# Patient Record
Sex: MALE | Race: OTHER
[De-identification: names, ages, dates, MRNs, and addresses within clinical notes are randomized per-mention and may not be internally consistent; named-entity substitution may affect disease eponyms.]

---

## 2018-02-15 ENCOUNTER — HOSPITAL ENCOUNTER (EMERGENCY)
Dept: HOSPITAL 80 - FED | Age: 28
Discharge: HOME | End: 2018-02-15
Payer: COMMERCIAL

## 2018-02-15 VITALS
SYSTOLIC BLOOD PRESSURE: 138 MMHG | OXYGEN SATURATION: 95 % | TEMPERATURE: 97.7 F | RESPIRATION RATE: 16 BRPM | HEART RATE: 75 BPM | DIASTOLIC BLOOD PRESSURE: 69 MMHG

## 2018-02-15 DIAGNOSIS — T65.6X1A: Primary | ICD-10-CM
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
## 2018-02-15 DIAGNOSIS — T22.511A: ICD-10-CM

## 2018-02-15 NOTE — EDPHY
H & P


Time Seen by Provider: 02/15/18 18:23


HPI/ROS: 





CHIEF COMPLAINT: 


Chemical burn





HISTORY OF PRESENT ILLNESS: 


Patient complains of a chemical skin burn to the right forearm.  This happened 

this evening.  He was working with phalloidin stain in the microbiology lab at 

[facility].  He works as a laboratory technician.  This chemical 

accidentally splashed onto the bare scan of the right posterior forearm.  This 

is a very small area.  Non circumferential.  There is pain at the site of 

contact.  No pain distally.  No difficulty moving the extremity.  There is no 

contact with the head, face, eyes or oral mucosa.  No contact anywhere else on 

his person.  He irrigated the area with water copiously.  He did not apply any 

topical medications.  No other associated complaints or modifying factors








TETANUS STATUS:


Up-to-date





MEDICAL/SURGICAL/SOCIAL HISTORY:


Denies any medical diagnoses.  [employer] [facility] [employer] student.  Originally from 

[place]





REVIEW OF SYSTEMS:


Ten systems reviewed and are negative unless otherwise noted in the HPI





EXAMINATION


General Appearance:  Alert, no distress


Head: normocephalic, atraumatic


ENT:  Pupils equal round reactive.  No icterus or injection.  No periorbital 

erythema.


Cardiovascular:  Pulses normal throughout. Brisk cap refill


Neurological:  A&O, sensory symmetric, strength symmetric


Skin:  Grossly intact for warm and dry.  There is a 1.5 cm, linear area of 

erythema to the right posterior lateral forearm.  Non circumferential.  No 

bleeding.  Local irritation noted.  No blisters


Extremities:  Nontender, no pedal edema





DIFFERENTIAL DIAGNOSES:


Including but not limited to chemical burn, chemical exposure








MDM:


6:23 p.m.


Superficial chemical burn to the posterior-lateral aspect of the right wrist.  

This is 1.5 cm in length and thus non circumferential.  Poison Control has been 

contacted by the RN (Ch[place]) and a case number is documented.  He is in no acute 

distress with normal vital signs.  He is neuro intact distally.  Supportive 

care is the only recommendation from poison Control.  He will be provided a 

nonocclusive dressing.  We discussed the importance of protective equipment in 

the lab.  We discussed worker's compensation follow-up in ED precautions.  

Discharged home stable condition.








SUPERVISION:


This patient was independently evaluated without direct involvement of or 

examination by the attending physician. 





ED Precautions: 


Worsening pain. Erythema, edema, cyanosis, pallor, paresthesia or anesthesia.








Smoking Status: Never smoked


Constitutional: 


 Initial Vital Signs











Temperature (C)  97.7 F   02/15/18 17:49


 


Heart Rate  75   02/15/18 17:49


 


Respiratory Rate  16   02/15/18 17:49


 


Blood Pressure  138/69 H  02/15/18 17:49


 


O2 Sat (%)  95   02/15/18 17:49








 











O2 Delivery Mode               Room Air














Allergies/Adverse Reactions: 


 





No Known Allergies Allergy (Unverified 04/05/15 13:51)


 








Home Medications: 














 Medication  Instructions  Recorded


 


Bactrim DS  04/05/15














MDM/Departure





- Depart


Disposition: Home, Routine, Self-Care


Clinical Impression: 


 Chemical burn





Condition: Good


Instructions:  Chemical Skin Burn (ED)


Additional Instructions: 


1. Nonocclusive dressing as needed as discussed


2. Follow up with worker's compensation Clinic


3. ED precautions as discussed


Stand Alone Forms:  Work Comp Follow Up


Referrals: 


Charbel MARLOW [Clinic] - As per Instructions

## 2018-03-16 ENCOUNTER — HOSPITAL ENCOUNTER (EMERGENCY)
Dept: HOSPITAL 80 - FED | Age: 28
Discharge: HOME | End: 2018-03-16
Payer: COMMERCIAL

## 2018-03-16 VITALS
SYSTOLIC BLOOD PRESSURE: 133 MMHG | TEMPERATURE: 98.1 F | HEART RATE: 73 BPM | DIASTOLIC BLOOD PRESSURE: 64 MMHG | RESPIRATION RATE: 18 BRPM

## 2018-03-16 VITALS — OXYGEN SATURATION: 96 %

## 2018-03-16 DIAGNOSIS — K64.5: Primary | ICD-10-CM

## 2018-03-16 PROCEDURE — 06BY0ZC EXCISION OF HEMORRHOIDAL PLEXUS, OPEN APPROACH: ICD-10-PCS | Performed by: EMERGENCY MEDICINE

## 2018-03-16 NOTE — EDPHY
H & P


Stated Complaint: c/o anal discomfort, recent constip causing pain, improved, 

this am worse


Time Seen by Provider: 03/16/18 01:18


HPI/ROS: 





Chief Complaint:  Rectal pain





HPI:  A 27-year-old male presenting with 2 days of worsening rectal pain.  

Patient states he has noted swelling in that area is concerned he might have 

some hemorrhoids.  Patient states that he frequently strange when he has a 

bowel movement.  No nausea or vomiting.  He also has been taking some Imodium 

to decrease his stools at the recommendation of his girlfriend.  No nausea or 

vomiting.  He has not been taking any medication for the pain.





ROS:  10 point Review of Systems is negative except as noted in the HPI.





PMH:  Denies





Social History: No smoking, no alcohol,  no recreational drug use





Family History: non-contributory





Physical Exam:


Gen: Awake, Alert, No Distress


HEENT:  


     Nose: no rhinorrhea


     Eyes: PERRLA, EOMI


     Mouth: Moist mucosa 


Neck: Supple, no JVD


Chest: nontender, lungs clear to auscultation


Heart: S1, S2 normal, no murmur


Abd: Soft, non-tender, no guarding


Back: no CVA tenderness, no midline tenderness 


Rectal:  Patient has a large 1 cm x 2 cm thrombosed hemorrhoid on the left.


Genital:  Normal uncircumcised penis, no scrotal tenderness swelling or 

erythema.


Ext: no edema, non-tender


Skin: no rash


Neuro: CN II-XII intact, Sensation grossly intact, Strength 5/5 in bilateral 

upper and lower extremities








- Medical/Surgical History


Hx Asthma: No


Hx Chronic Respiratory Disease: No


Hx Diabetes: No


Hx Cardiac Disease: No


Hx Renal Disease: No


Hx Cirrhosis: No


Hx Alcoholism: No


Hx HIV/AIDS: No


Hx Splenectomy or Spleen Trauma: No


Other PMH: denies





- Social History


Smoking Status: Never smoked


Constitutional: 


 Initial Vital Signs











Temperature (C)  36.8 C   03/16/18 00:42


 


Heart Rate  82   03/16/18 00:42


 


Respiratory Rate  16   03/16/18 00:42


 


Blood Pressure  114/75   03/16/18 00:42


 


O2 Sat (%)  96   03/16/18 00:42








 











O2 Delivery Mode               Room Air














Allergies/Adverse Reactions: 


 





No Known Allergies Allergy (Verified 03/16/18 00:48)


 








Home Medications: 














 Medication  Instructions  Recorded


 


NK [No Known Home Meds]  03/16/18














Medical Decision Making


Procedures: 





Procedure note:  Excision of thrombosed external hemorrhoid.


Indication:  Thrombosed external hemorrhoid.  Patient was placed in a prone 

position.  Tape was used to retract the buttocks.  Patient was prepped with 

Hibiclens.  The area was anesthetized with 1% lidocaine with epinephrine 

infiltration.  The thrombosed hemorrhoid was excised in a radial direction with 

an 11 blade.  An elliptical excision was performed.  A moderate amount of blood 

clot was removed.  Area was cleaned thoroughly.  Patient was given a gauze pad.

  He tolerated the procedure without any difficulty.  There were no 

complications.  Performed by myself.





Departure





- Departure


Disposition: Home, Routine, Self-Care


Clinical Impression: 


 Hemorrhoid





Condition: Good


Instructions:  Hemorrhoids (ED), High Fiber Diet (ED), Thrombosed Hemorrhoid (ED

)


Additional Instructions: 


Make sure to increase the fiber in your diet.


You may start taking MiraLax every day according to package instructions.


May take ibuprofen alternating with acetaminophen as needed for pain.


Soak in a warm tub 2 to 3 times a day.


Drink at least 8 glasses of water a day.


It is important that you not strain when you have a bowel movement.


Follow up with General surgery in about a week for further evaluation.


Referrals: 


Jase Ferguson MD [Medical Doctor] - As per Instructions

## 2019-01-07 ENCOUNTER — HOSPITAL ENCOUNTER (EMERGENCY)
Dept: HOSPITAL 80 - FED | Age: 29
Discharge: HOME | End: 2019-01-07
Payer: MEDICAID

## 2019-01-07 VITALS — DIASTOLIC BLOOD PRESSURE: 61 MMHG | SYSTOLIC BLOOD PRESSURE: 122 MMHG

## 2019-01-07 DIAGNOSIS — R04.0: Primary | ICD-10-CM

## 2019-01-07 NOTE — EDPHY
H & P


Stated Complaint: Bloody nose x2 hours, wont stop


Time Seen by Provider: 01/07/19 01:17


HPI/ROS: 





Chief Complaint:  Nosebleed





HPI:  28-year-old male's been having a nosebleed for the last 2 hr.  Patient 

states that is mixed with blood and mucus.  Has been a slow ooze.  Has been 

using some nasal spray recently.  Does have a history of some bloody nose in 

the past.  Denies any other foreign bodies in his nose.  No other drug use.  No 

lightheadedness or fainting.  No history of blood clotting disorders in the 

family.  No recent trauma.





ROS:  10 systems were reviewed and were negative except those elements noted in 

the HPI.





PMH:  Denies





Social History: No smoking, no alcohol,  no recreational drug use





Family History: non-contributory





Physical Exam:


Gen: Awake, Alert, No Distress


HEENT:  


     Nose: no rhinorrhea, no blood.  No dried blood.  No clot, No active 

bleeding.


     Eyes: PERRLA, EOMI


     Mouth: Moist mucosa 


Neck: Supple, no JVD


Chest: nontender, lungs clear to auscultation


Heart: S1, S2 normal, no murmur


Abd: Soft, non-tender, no guarding


Back: no CVA tenderness, no midline tenderness 


Ext: no edema, non-tender


Skin: no rash


Neuro: CN II-XII intact, Sensation grossly intact, Strength 5/5 in bilateral 

upper and lower extremities








- Personal History


Current Tetanus Diphtheria and Acellular Pertussis (TDAP): No





- Medical/Surgical History


Hx Asthma: No


Hx Chronic Respiratory Disease: No


Hx Diabetes: No


Hx Cardiac Disease: No


Hx Renal Disease: No


Hx Cirrhosis: No


Hx Alcoholism: No


Hx HIV/AIDS: No


Hx Splenectomy or Spleen Trauma: No


Other PMH: denies





- Social History


Smoking Status: Never smoked


Constitutional: 


 Initial Vital Signs











Temperature (C)  36.8 C   01/07/19 01:13


 


Heart Rate  80   01/07/19 01:13


 


Respiratory Rate  18   01/07/19 01:13


 


Blood Pressure  122/61 H  01/07/19 01:13


 


O2 Sat (%)  95   01/07/19 01:13








 











O2 Delivery Mode               Room Air














Allergies/Adverse Reactions: 


 





No Known Allergies Allergy (Verified 01/07/19 01:13)


 








Home Medications: 














 Medication  Instructions  Recorded


 


NK [No Known Home Meds]  03/16/18














Medical Decision Making


ED Course/Re-evaluation: 





28-year-old male presenting with epistaxis.  No active bleeding here.  I have 

instilled Francisco J-Synephrine and applied a client.  He has had no further bleeding.

  Will discharge with follow-up as an outpatient.





- Data Points


Medications Given: 


 








Discontinued Medications





Lidocaine HCl (Lidocaine Hcl 4% Topical Solution)  0 ml TP EDNOW ONE


   Stop: 01/07/19 01:31


   Last Admin: 01/07/19 01:32 Dose:  1 btl


Oxymetazoline HCl (Afrin Nasal Spray)  2 sprays EACHNARE EDNOW ONE


   Stop: 01/07/19 01:30


   Last Admin: 01/07/19 01:32 Dose:  2 sprays


Silver Nitrate/Potassium Nitrate (Silver Nitrate Applicator)  1 each TP EDNOW 

ONE


   Stop: 01/07/19 01:30


   Last Admin: 01/07/19 01:33 Dose:  1 each








Departure





- Departure


Disposition: Home, Routine, Self-Care


Clinical Impression: 


 Acute anterior epistaxis





Condition: Good


Instructions:  Nosebleed (ED)


Additional Instructions: 


If the bleeding returns apply the nasal clamp ear nose Aleve in place for 20-30 

minutes.  If this does not control the bleeding return to the emergency 

department.


Follow up with primary care physician in 3-4 days for further evaluation.


Referrals: 


Diamond Azevedo DO [Doctor of Osteopathy] - As per Instructions

## 2019-01-11 ENCOUNTER — HOSPITAL ENCOUNTER (EMERGENCY)
Dept: HOSPITAL 80 - FED | Age: 29
Discharge: HOME | End: 2019-01-11
Payer: MEDICAID

## 2019-01-11 VITALS — SYSTOLIC BLOOD PRESSURE: 116 MMHG | DIASTOLIC BLOOD PRESSURE: 66 MMHG

## 2019-01-11 DIAGNOSIS — R04.0: Primary | ICD-10-CM

## 2019-01-11 PROCEDURE — 3E09XGC INTRODUCTION OF OTHER THERAPEUTIC SUBSTANCE INTO NOSE, EXTERNAL APPROACH: ICD-10-PCS

## 2019-01-11 NOTE — EDPHY
HPI/HX/ROS/PE/MDM


Narrative: 


CLINICAL IMPRESSION:  Bilateral anterior epistaxis 





ASSESSMENT/PLAN: 





Patient is a 28-year-old male with no significant medical history who presents 

to the emergency department complaining of bilateral epistaxis, right greater 

than left.  Patient is afebrile and nontoxic-appearing, he is in no acute 

distress on arrival. physical examination revealed very small area of 

friability on the bilateral anterior septum. There was no obvious hematoma, 

perforation, foreign body, mass or evidence of a posterior epistaxis.  Afrin 

was administered bilaterally and the small areas were cauterized with silver 

nitrate as discussed in the procedure note. The patient was observed for a 

period of time with no recurrent bleeding or evidence of posterior epistaxis. 





History and physical examination is consistent with bilateral anterior 

epistaxis. On repeat examination the patient is well appearing, there is no 

active bleeding and no blood noted in the posterior pharynx.  Patient does not 

have a primary care provider, I provided a list of referrals for him to 

establish care with.  Strict return precautions were discussed- patient will 

return for recurrent bleeding, dizziness, headache, chest pain, shortness of 

breath or for any other concerning symptom. The patient and family members all 

verbalized understanding and they were in agreement with this plan.





Case and plan of care discussed with Dr. Ladd.





DIFFERENTIAL DX: 


Anterior epistaxis, posterior epistaxis, septal perforation, septal hematoma, 

acute blood loss anemia 





ED PROCEDURES: 





Procedure:  Epistaxis control.


After verbal consent was obtained, the patient was anesthetized with Afrin.  

The anterior epistaxis was identified, bilateral anterior septum.  The patient 

was treated with silver nitrate.  Following the procedure the patient was re-

examined and the bleeding was well controlled.  The patient tolerated the 

procedure well.  The procedure was performed by myself.





ED COURSE: 


CHIEF COMPLAINT:  Nose bleed 





HPI: 





Patient is a 28-year-old male who presents to the emergency department 

complaining of bilateral epistaxis.  Patient reports that he woke up this 

morning and noticed some blood on his pillow, realized that he was bleeding 

from his bilateral nares.  The left side stopped.  However the right-side 

continued to bleed.  Patient applied pressure and proceeded to the emergency 

department as he could not get it completely stopped.  Patient does report 

increased difficulty with nosebleeds over the last 2-3 weeks, he denies any 

trauma, fever, chills, headache, dizziness, chest pain or shortness of breath.  

He is not on aspirin or anticoagulation.


_________________ 


PAST MEDICAL HISTORY:  Denies 


Pertinent Past Surgical History:  Denies 


Family History:  Not contributory 


Social History:  Denies smoking, denies alcohol and denies illicit drug use. 


_________________ 


ROS: 


A full 10 point review of systems was negative except for those mentioned in 

HPI. 


_________________ 


PHYSICAL EXAM: 


General Appearance: Alert, oriented, appropriate, cooperative, NAD, well 

hydrated, non-toxic appearing, VSS, no hypoxia. 


HENT:


Normocephalic, atraumatic


TMs are clear bilaterally no perforation or FB, no injection, no evidence of 

serous or mucopurulent otitis. 


Left nares is clear, mucosa is pink.  There is a very small area of friability 

and mild ooze on the anterior septum.


Right nares with a very small friable area on the anterior septum, not actively 

bleeding.


There is no evidence of septal perforation or septal hematoma.


Oropharynx clear is no erythema or exudates, no tonsillar hypertrophy or 

asymmetry.  There is no blood in the posterior pharynx.  Dentition without 

abnormality.


Eyes: PERRLA, no acute vision change, nystagmus, swelling, discharge, pain or 

photosensitivity. Conjunctiva pink, no pallor or injection 


Neck: Supple, nontender, no lymphadenopathy, no midline pain, FROM, no 

meningismus. 


Respiratory: There are no retractions, lungs are clear to auscultation. 


Cardiac: Regular rate and rhythm, no murmurs or gallops. 


Gastrointestinal: Abdomen is soft, nontender, bowel sounds normal, no masses/

hernia, no rigidity, guarding or focal peritoneal findings. 


Skin: Warm, dry, no rashes, no nodules on palpation. 


_________________ 


MEDICAL DECISION MAKING: 


Patient was seen independently. Secondary supervising physician at time of 

evaluation was Dr. Ladd. 


Diagnosis:  Right anterior epistaxis. New, requires workup 


Summary: See Assessment and Plan for summary of ED visit  


Clinical lab tests:  Not applicable. 


Independent visualization of images, tracing, or specimens:  Not applicable. 


Decision to obtain medical records or history from someone other than the 

patient:  No 


Review / Summarize previous medical records:  Yes 


Discussed patient with another provider:  Yes, Dr. Ladd 


Patient Progress:  Stable, discharged home.  (Carmela Klein)


ED Course: 





The patient was evaluated and managed by the physician assistant.  I have 

reviewed this chart and I agree with the findings and plan of care as documented

, as indicated by my signature.  I am the secondary supervising physician. (

Magaly Ladd)





- Data Points


Medications Given: 





 








Discontinued Medications





Oxymetazoline HCl (Afrin Nasal Spray)  2 sprays EACHNARE EDNOW ONE


   Stop: 01/11/19 09:02


   Last Admin: 01/11/19 09:09 Dose:  2 sprays


Silver Nitrate/Potassium Nitrate (Silver Nitrate Applicator)  1 each TP EDNOW 

ONE


   Stop: 01/11/19 09:03


   Last Admin: 01/11/19 09:10 Dose:  1 each








General


Initial Vital Signs: 





 Initial Vital Signs











Temperature (C)  36.5 C   01/11/19 08:22


 


Heart Rate  54 L  01/11/19 08:22


 


Respiratory Rate  16   01/11/19 08:22


 


Blood Pressure  115/63   01/11/19 08:22


 


O2 Sat (%)  99   01/11/19 08:22








 











O2 Delivery Mode               Room Air














Allergies/Adverse Reactions: 


 





No Known Allergies Allergy (Verified 01/11/19 08:21)


 








Home Medications: 














 Medication  Instructions  Recorded


 


Accucaine Kit  01/11/19














Departure





- Departure


Disposition: Home, Routine, Self-Care


Clinical Impression: 


 Epistaxis





Condition: Good


Instructions:  Nosebleed (ED)


Additional Instructions: 


DISCHARGE INSTRUCTIONS FROM YOUR DOCTOR 


Thank you for visiting our emergency department today. Please keep in mind that 

discharge from the emergency department does not mean that there is nothing 

wrong - it simply means that we have not identified an emergency condition that 

requires further evaluation or treatment in the hospital. 





You should always plan to follow up with primary care for re-evaluation of your 

condition in the next 2-3 days. 





Avoid strenuous activity like heavy lifting, pushing, pulling, carrying, 

bearing down for the next 24 hours.





Avoid nose blowing for 12-24 hours. 





Once the area is healed, consider using a saline nasal spray daily (ie: Bertie 

or Ayr).





Do not use Afrin daily. You may use Afrin as directed with the nasal clamp for 

a recurrent nose bleed.





Consider running a cool mist humidifier in your home. 





As discussed if the nose re-bleeds, apply pressure for timed 20 minutes and sit 

upright. Drink a glass of cold water.








Return for recurrent bleeding, development of fever, severe headache, dizziness 

or lightheadedness, fainting, chest pain, shortness of breath, severe headache, 

discolored drainage from the nose, other signs of bleeding like blood in the 

urine, unusual bruising, blood in the stool, vomiting blood, facial pain, 

difficulty breathing or swallowing, numbness, tingling, weakness, or for any 

other new, worrisome or worsening symptoms. 








People present with illnesses and injuries in different ways, and it is always 

possible that we have missed something. You may always return for re-evaluation 

if symptoms worsen or if they are not improving or if you develop new/different 

symptoms. 





Again, thank you for choosing our emergency department. We hope that you feel 

better.


Referrals: 


Gladys Dozier MD [Medical Doctor] - As per Instructions

## 2019-01-21 ENCOUNTER — HOSPITAL ENCOUNTER (EMERGENCY)
Dept: HOSPITAL 80 - FED | Age: 29
Discharge: HOME | End: 2019-01-21
Payer: MEDICAID

## 2019-01-21 VITALS — SYSTOLIC BLOOD PRESSURE: 122 MMHG | DIASTOLIC BLOOD PRESSURE: 65 MMHG

## 2019-01-21 DIAGNOSIS — R04.0: Primary | ICD-10-CM

## 2019-01-21 PROCEDURE — 3E09XTZ INTRODUCTION OF DESTRUCTIVE AGENT INTO NOSE, EXTERNAL APPROACH: ICD-10-PCS

## 2019-01-21 NOTE — EDPHY
General


Time Seen by Provider: 01/21/19 08:55


Narrative: 





CLINICAL IMPRESSION:  Right-sided anterior epistaxis


_________________


ASSESSMENT/PLAN:


28-year-old male presents to the emergency department for the 3rd time with 

intermittent epistaxis.  Patient was sent from urgent care today after he awoke 

with a right-sided epistaxis today.  On exam, patient has prominent vessels at 

bilateral anterior Kesselbach plexus with excoriation and mild oozing from a 

vessel on the right anterior inferior nasal septum.  This was treated with 

oxymetazoline, 4% lidocaine cream, and silver nitrate.  I then applied Surgicel 

to cauterized area.  Patient road tested through the ED without further 

bleeding.  I have strongly emphasized the importance of outpatient supportive 

care and detailed discharge instructions were given.  ENT referral provided.  

Recent lab work at the end of December showed no evidence of anemia, abnormal 

coag panels, or suggestion for underlying bleeding disorder although he did not 

have formal hematologic workup.  I have recommended follow-up with a specialist 

and PCP if bleeding reoccurs.  Warning signs return to ED sooner outlined and 

discharge papers.


_________________


DIFFERENTIAL DX:


 Differential diagnosis includes but not limited to anterior nasal septal 

bleeding cheek, posterior nasal arterial bleed, intranasal mass, nasal polyposis

, mucous membrane dryness, coagulopathy


_________________


ED PROCEDURES:


 See lab and/or imaging results below





Epistaxis Procedure


Verbal consent given by the patient after risks and alternatives were discussed 

including bleeding, infection, nasal injury, pain, delayed or alternative 

treatment. Anesthesia obtained with topical 4% lidocaine and oxymetazoline. 

Treatment site right anterior inferior nasal septum. Treatment method silver 

nitrate. Treatment was limited, episode was initial. Postprocedure, bleeding 

was stopped, patient tolerated well with no immediate complications.  Road 

tested through the emergency department without further epistaxis.





ED COURSE:


_________________


CHIEF COMPLAINT: Bloody nose 


_________________


HPI:


28-year-old male presents to the emergency department from urgent care for 

evaluation of right-sided epistaxis.  Patient reports he awoke this morning and 

started noticing bleeding from the right side of his nose.  He then began 

noticing over flow to the left side of the nose.  He reports bilateral 

epistaxis use intermittently for the last 3-4 weeks.  He has had at least 3 ER 

visits for this.  He has had cautery.  He reports bleeding stops for about 3 

days in between cautery.  He denies any recent nasal surgery or trauma.  No 

history of chronic nose bleeds in the past.  No known family history of 

coagulopathy or bleeding disorders.  He does not use intranasal drugs.  No 

history of night sweats, unexplained weight loss, or other B symptoms.  No rash

, easy bruising or fatigue or weakness.  No history of anemia.  He has not 

followed up with primary care or [facility]


_________________


PAST MEDICAL HISTORY: 


None reported See triage summary and nurse notes for addition applicable 

history 





Pertinent Past Surgical History:  None reported





Family History:  No family history of coagulopathy





Social History:  Works in a research lab in [employer]


_________________


REVIEW OF SYSTEMS:


A full 10 point review of systems was negative except for those mentioned in 

HPI. 


_________________


PHYSICAL EXAM:


General Appearance:  Alert, oriented, appropriate, cooperative, NAD, well 

hydrated, non-toxic appearing, VSS, not actively bleeding no hypoxia.


HEENT: TMs are clear bilaterally no perforation or FB, no injection, no 

evidence of serous or mucopurulent otitis. Oropharynx clear is no erythema or 

exudates, no tonsillar hypertrophy or asymmetry.  Nasal clip removed, no active 

bleeding identified.  Prominent vessels at Kiesselbach plexus bilaterally.  No 

blood noted in the posterior pharynx.  Dentition without abnormality.


Respiratory:  There are no retractions, lungs are clear to auscultation.


Cardiac:  Regular rate and rhythm, no murmurs or gallops.


Skin: Warm, dry, no rashes, no petechiae or purpura, no nodules on palpation.


_________________


MEDICAL DECISION MAKING:


Patient was seen independently. Secondary supervising physician at time of 

evaluation was: Dr. Cat .


Diagnosis:  Epistaxis. New, requires workup


Summary: See Assessment and Plan for summary of ED visit 


Clinical lab tests:  Reviewed lab work from late December, no abnormalities 

identified.


Independent visualization of images, tracing, or specimens:  None obtained.


Decision to obtain medical records or history from someone other than the 

patient:  No


Review / Summarize previous medical records:  Reviewed past ED chart notes


Discussed patient with another provider:  No





Patient Progress:  Improved. 





- History


Smoking Status: Never smoked





- Objective


Vital Signs: 


 Initial Vital Signs











Temperature (C)  36.7 C   01/21/19 08:52


 


Heart Rate  60   01/21/19 08:52


 


Respiratory Rate  16   01/21/19 08:52


 


Blood Pressure  113/61   01/21/19 08:52


 


O2 Sat (%)  98   01/21/19 08:52








 











O2 Delivery Mode               Room Air














Allergies/Adverse Reactions: 


 





No Known Allergies Allergy (Verified 01/21/19 08:51)


 








Home Medications: 














 Medication  Instructions  Recorded


 


Accucaine Kit  01/11/19











Medications Given: 


 








Discontinued Medications





Lidocaine (Anecream 4% Cream)  1 baldemar TP ONCE ONE


   Stop: 01/21/19 09:12


   Last Admin: 01/21/19 09:24 Dose:  1 dose


Oxymetazoline HCl (Afrin Nasal Spray)  2 sprays EACHNARE EDNOW ONE


   Stop: 01/21/19 09:11


   Last Admin: 01/21/19 09:13 Dose:  2 sprays


Silver Nitrate/Potassium Nitrate (Silver Nitrate Applicator)  2 each TP EDNOW 

ONE


   Stop: 01/21/19 09:29


   Last Admin: 01/21/19 09:33 Dose:  2 each








Departure





- Departure


Disposition: Home, Routine, Self-Care


Clinical Impression: 


 Epistaxis





Condition: Good


Instructions:  Nosebleed (ED)


Additional Instructions: 


DISCHARGE INSTRUCTIONS FROM YOUR DOCTOR 


Thank you for visiting our emergency department today. Please keep in mind that 

discharge from the emergency department does not mean that there is nothing 

wrong - it simply means that we have not identified an emergency condition that 

requires further evaluation or treatment in the hospital. You should always 

plan to follow up with primary care for re-evaluation of your condition in the 

next 2-3 days. If you have been referred to a specialist, please call as soon 

as possible (today or tomorrow) to schedule your follow up appointment at the 

appropriate time. 





AVOID RUBBING, PICKING OR BLOWING YOUR NOSE FOR 3 DAYS. IN 24 HOURS, OR AFTER 

PACKING IS REMOVED, PLEASE GENTLY APPLY VASELINE, AYR, OR CANEASE GEL TO THE 

FRONT OF YOUR NOSE THREE TIMES PER DAY FOR MOISTURIZATION. DO NOT USE VASELINE 

IF YOU WEAR OXYGEN. PLEASE CONSIDER USING A HUMIDIFYER IN YOUR HOME. IF YOU 

BEGIN BLEEDING AGAIN AND DO NOT HAVE PACKING IN PLACE, YOU CAN TRY SOAKING A 

COTTON BALL WITH AFRIN NOSE SPRAY, OTC, PLACE THIS GENTLY IN THE FRONT OF YOUR 

NOSE, PINCH THE FRONT OF YOUR NOSE AND LEAN YOUR HEAD FORWARD. HOLD PRESSURE 

FOR 15 MIN. DO NOT PLACE COTTON BALL SO FAR IN YOUR NOSE AS TO NOT BE ABLE TO 

TAKE IT BACK OUT. IF BLEEDING CONTINUES, RETURN TO ER. 








People present with illnesses and injuries in different ways, and it is always 

possible that we have missed something. You may always return for re-evaluation 

if symptoms worsen or if they are not improving or if you develop new/different 

symptoms. 


Again, thank you for choosing our emergency department. We hope that you feel 

better.





Referrals: 


NONE *PRIMARY CARE P,. [Primary Care Provider] - As per Instructions


Chris Peguero MD [Medical Doctor] - 1-2 days without fail